# Patient Record
(demographics unavailable — no encounter records)

---

## 2024-10-16 NOTE — PHYSICAL EXAM
[General Appearance - Well Developed] : well developed [Normal Appearance] : normal appearance [Well Groomed] : well groomed [General Appearance - Well Nourished] : well nourished [No Deformities] : no deformities [General Appearance - In No Acute Distress] : no acute distress [Normal Conjunctiva] : the conjunctiva exhibited no abnormalities [Eyelids - No Xanthelasma] : the eyelids demonstrated no xanthelasmas [Respiration, Rhythm And Depth] : normal respiratory rhythm and effort [Exaggerated Use Of Accessory Muscles For Inspiration] : no accessory muscle use [Auscultation Breath Sounds / Voice Sounds] : lungs were clear to auscultation bilaterally [Heart Rate And Rhythm] : heart rate and rhythm were normal [Heart Sounds] : normal S1 and S2 [Abdomen Soft] : soft [Abdomen Tenderness] : non-tender [Abdomen Mass (___ Cm)] : no abdominal mass palpated [Abnormal Walk] : normal gait [Gait - Sufficient For Exercise Testing] : the gait was sufficient for exercise testing [Nail Clubbing] : no clubbing of the fingernails [Cyanosis, Localized] : no localized cyanosis [Petechial Hemorrhages (___cm)] : no petechial hemorrhages [Skin Color & Pigmentation] : normal skin color and pigmentation [] : no rash [No Venous Stasis] : no venous stasis [Skin Lesions] : no skin lesions [No Skin Ulcers] : no skin ulcer [No Xanthoma] : no  xanthoma was observed [Oriented To Time, Place, And Person] : oriented to person, place, and time [Affect] : the affect was normal [Mood] : the mood was normal [No Anxiety] : not feeling anxious [FreeTextEntry1] : trace to mild edema

## 2024-10-16 NOTE — ASSESSMENT
[FreeTextEntry1] : Aortic valve disease- Pt has been followed by echo since at least 2017.  He has had mild and mild to moderate aortic insufficiency(AI). Echo from January 2022 reveals mild to moderate AI and now mild aortic stenosis(AS). We discussed how this valve lesions will progress, generally slowly.  We follow disease progression by echo and physical exam.  Parameters that we follow are heart size and function and the aortic valve size and pressure gradients.  Updated July 2022.  Mild to mod AS. Aug 2023.  PreOp- Pt is cleared for lumbar spine surgery. C spine surgery went well.  He has mild to mod AS.  Also has CAD with a CCS of 344.  Last Nuc April 2021 was unchanged with inferior scar, no ischemia. Dr Smith will control the Warfarin again.  Needs cataract surgery.  He is cleared. 9/19/24 optimized for spinal surgery 9/24. had an echo today with mild to mod AS -stable, good LV fx. ekg stable. will hold lisinopril and eplerenone the am of surgery. anticoag per Dr Smith's recommendation   Ongoing back pain, 1 x Tramadol Rx, iSTOP performed. BP low, reduce Norvasc to 5 mg   ASHD- , + Nuc March 2019, IWMI despite prone imaging, Echo Feb 2019 WNL.  I suspect a small IWMI. Can vaguely see it on EKG.  Nuclear stress test was unchanged in 2021 prior to GB surgery.  Fatigue- With walking and occasionally at home, I suspect low BP, Norvasc  at 10 mg.  HTN- BPs up .  In light of the AI, would aim for a lower BP. Saw Dr Pike.  BPs have been good previously but might have been exacerbated by the pain.  GB removed, abdominal pain resolved. BP reasonable on triple therapy. Lisinopril reduced from 40 mg to 10 mg.   BP up, 160/60, could be d/t moderate AI.  Will increase Norvasc back to 10 mg.  Reducing to 5 mg again for low BP.   Lupus AC- maintained on Warfarin and INR followed by Dr Smith, Remote PE.  HLD- continue statin 28-Feb-2024 13:11

## 2024-10-16 NOTE — REASON FOR VISIT
[FreeTextEntry1] : 74 y/o M with a + Lupus AC maintained on Coumadin for DVT and PE..  Also has a h/o mild to moderate aortic insufficiency.  Echo done at [facility] Feb 2019 mod AI, NL wall motion, Nuc March 2019 c/w small IWMI. Here with wife 8/26/19 1/2/20 On diuretic for his ear.  Reports loss of energy with walking, and also at home. No CP, + DIEHL but unchanged 4/15/21 BPH to have prostate surgery, nuc 2 y/a had an IWMI without ischemia 2/2/22 On teva betahistine.  Echo updated 1/21/22 revealing mild AS and mild to moderate AI. 7/20/22 Having c spine surgery Aug 8.  Echo updated July 2022, mild to mod AS. 1/4/23 Had C spine surgery Oct 2022 and now needs lumbar spinal surgery.  Scheduled for 1/24/23 with Dr Moncada. 5/10/23 s/p spine lumbar surgery, went well. 10/10/23 s/p 2 spine surgeries. 4/10/24 On Eplerenone for proteinuria, having back pain. 9/18/24 having lumbar spine surgery with Dr Schwab on 9/24/24. fax [phone]. Dr Smith is managing coumadin -last dose thursday 9/19 - last dose of lovenox sunday 9/22. feeling well overall. significantly limited by pain. has previously tolerated spinal surgeries however reports low bps during surgery.  10/16/24  s/p 2 spine surgeries and still in moderate pain.  EKG: NSR, normal axis and intervals, Q in 3 and F, LVH, PRWP, no ST-Tw abnormalities. 2/25/19 EKG: NSR, normal axis and intervals, PRWP, probable old IWMI, no ST-Tw abnormalities.  EKG: NSR, small Q waves in 3 and F, 4/15/21

## 2024-10-16 NOTE — HISTORY OF PRESENT ILLNESS
[FreeTextEntry1] : Renal- Dr Pike Neuro- Dr Najjar(Sz) - Dr Boyd Pulobie- Dr Smith Heme- Dr Digna Jones- Dr Lei  [facility]  [facility]     Wife Raysa Keenan

## 2024-10-29 NOTE — PHYSICAL EXAM
[General Appearance - Alert] : alert [General Appearance - In No Acute Distress] : in no acute distress [Sclera] : the sclera and conjunctiva were normal [Jugular Venous Distention Increased] : there was no jugular-venous distention [Auscultation Breath Sounds / Voice Sounds] : lungs were clear to auscultation bilaterally [Heart Rate And Rhythm] : heart rate was normal and rhythm regular [Heart Sounds] : normal S1 and S2 [Systolic grade ___/6] : A grade [unfilled]/6 systolic murmur was heard. [___ +] : bilateral [unfilled]+ pretibial pitting edema [Abdomen Soft] : soft [Abdomen Tenderness] : non-tender [No CVA Tenderness] : no ~M costovertebral angle tenderness [Involuntary Movements] : no involuntary movements were seen [] : no rash [Oriented To Time, Place, And Person] : oriented to person, place, and time [Affect] : the affect was normal Yes

## 2024-10-29 NOTE — PHYSICAL EXAM
[General Appearance - Alert] : alert [General Appearance - In No Acute Distress] : in no acute distress [Sclera] : the sclera and conjunctiva were normal [Jugular Venous Distention Increased] : there was no jugular-venous distention [Auscultation Breath Sounds / Voice Sounds] : lungs were clear to auscultation bilaterally [Heart Rate And Rhythm] : heart rate was normal and rhythm regular [Heart Sounds] : normal S1 and S2 [Systolic grade ___/6] : A grade [unfilled]/6 systolic murmur was heard. [___ +] : bilateral [unfilled]+ pretibial pitting edema [Abdomen Soft] : soft [Abdomen Tenderness] : non-tender [No CVA Tenderness] : no ~M costovertebral angle tenderness [Involuntary Movements] : no involuntary movements were seen [] : no rash [Oriented To Time, Place, And Person] : oriented to person, place, and time [Affect] : the affect was normal

## 2024-10-30 NOTE — HISTORY OF PRESENT ILLNESS
[FreeTextEntry1] : f/u CKD s/p repeat spine surgery which seems to be more successful with less pain lower back -- still numbness rt leg but slt better  amlodipine lowered by Dr Mitchell bec of low BP ( and leg swelling)  BP now 120/70 range now at PT noted some swelling tops of feet- no better with less amlodipine meds reviewed with pt and list updated labs done and reviewed - 10/23: ur alb 235 mg, cr 1.6, k 5.1, alb 3.5, hco3 27,ca 8.9,  ph 3.2, uric 7.2,  , cys eGFR 24 , hgb 8.2,  PCP Dr Smith

## 2024-10-30 NOTE — REVIEW OF SYSTEMS
[Negative] : Heme/Lymph [As noted in HPI] : as noted in HPI [Lower Ext Edema] : lower extremity edema [Sleep Disturbances] : sleep disturbances [de-identified] : nocturia and insomnia

## 2024-10-30 NOTE — ASSESSMENT
[FreeTextEntry1] : CKD 3  stable ( CKD 4 by cys EGFR)  BP well controlled albuminuria appears less on eplerenone ( with ACEi )  significant edema legs will lower amlodipine further to 2.5 mg -- follow BP and may stop it if BP remains good  ideally would use diuretic for edema (and stop amlodipine) for BP-- but has not tolerated any diuretics in past ( incl SGLT2i )  bec of  sx -- advised see CHRIS hennessy ( saw Dr Boyd in past)  I am also reluctant to increase MRA ( along with ACEi) without another med such as a k lowering diuretic to lower k add vit D3 2000 IU/d and follow PTH  f/u 2 mos with labs

## 2024-10-30 NOTE — REVIEW OF SYSTEMS
[Negative] : Heme/Lymph [As noted in HPI] : as noted in HPI [Lower Ext Edema] : lower extremity edema [Sleep Disturbances] : sleep disturbances [de-identified] : nocturia and insomnia

## 2024-11-05 NOTE — PHYSICAL EXAM
[de-identified] : NVI to the extremities.  Wound healed well, small crusts visible. [de-identified] : Scoliosis xray 10/31/2024:  Instrumentation intact, no halos about the screws.

## 2024-11-05 NOTE — PHYSICAL EXAM
[de-identified] : NVI to the extremities.  Wound healed well, small crusts visible. [de-identified] : Scoliosis xray 10/31/2024:  Instrumentation intact, no halos about the screws.

## 2024-11-05 NOTE — HISTORY OF PRESENT ILLNESS
[de-identified] : Pt is approximately 5 weeks s/p revision thoracolumbar spinal fusion.  He is walking with the cane or walker in the hallways.  Had lumbar pain and saw his kidney specialist and cardiologist, medications changed.  Pre-op numbness in his left distal leg is a little better.  Doing PT at home BIW, goal is to walk outside with PT.  Has seen slight progress.  Taking muscle relaxant and oxycodone 5mg prn.

## 2024-11-05 NOTE — DISCUSSION/SUMMARY
[de-identified] : A lengthy discussion was had with the patient regarding his condition.  Continue with regular exercise and walking program.  Rx outpt PT.  F/u in 2 months. The patient's questions were sought and answered satisfactorily.  I, Yulia Flores NP am acting as a scribe for Dr. Frank Schwab.

## 2024-11-05 NOTE — HISTORY OF PRESENT ILLNESS
[de-identified] : Pt is approximately 5 weeks s/p revision thoracolumbar spinal fusion.  He is walking with the cane or walker in the hallways.  Had lumbar pain and saw his kidney specialist and cardiologist, medications changed.  Pre-op numbness in his left distal leg is a little better.  Doing PT at home BIW, goal is to walk outside with PT.  Has seen slight progress.  Taking muscle relaxant and oxycodone 5mg prn.

## 2024-11-05 NOTE — DISCUSSION/SUMMARY
[de-identified] : A lengthy discussion was had with the patient regarding his condition.  Continue with regular exercise and walking program.  Rx outpt PT.  F/u in 2 months. The patient's questions were sought and answered satisfactorily.  I, Yulia Flores NP am acting as a scribe for Dr. Frank Schwab.

## 2024-11-29 NOTE — PHYSICAL EXAM
[General Appearance - Well Developed] : well developed [General Appearance - Well Nourished] : well nourished [Urethral Meatus] : meatus normal [Scrotum] : the scrotum was normal [de-identified] : 15 cc testicles b/l, no penoscrotal edema , no penile or scrotal lesions,  [de-identified] : +pitting edema b/l

## 2024-11-29 NOTE — ASSESSMENT
[FreeTextEntry1] : BPH/LUTS - non bothersome  no urologic source for lower extremity edema PVR to r/o retention - 24 cc  UA/UCx  Pt concerned about LE pitting edema - discussed following up with PCP/nephrology for optimization  f/u 1 year

## 2024-11-29 NOTE — HISTORY OF PRESENT ILLNESS
[FreeTextEntry1] : 74M presents as new visit for LUTS  Referred by: Dr. Pike (nephrology)  Has spinal stenosis , had two thoracolumbar spinal fusion (1.5 years ago, and most recent one 1/2024). Able to ambulate with a cane or walker. For ~4 months now, he has developed LE edema . Saw nephrology (Dr. Pike)  BPH/LUTS: Nocturia 2-3x. No hematuria or dysuria. Strong urinary FoS. Occasional splitting but not often. No urgency. Has a obrien catheter david-surgery, but otherwise no urinary retention. Not on any BPH meds. Is currently taking OTC "superbeets" to help with urination.   ED: Wife and him not as active as they once were, but no complaints as of now.   He is very concerned with LE edema. Amlodipine dose has been reduced.   PMH: bilateral subdural hematomas (?), DVT L LE + PE (>20 years ago), lupus anticoagulant, seizure, gastritis, skin melanoma, HTN, HLD, renal insufficiency/CKD, heart murmur PSH: cholecystectomy, thoracolumbar fusion X2  Meds: amlodipine, atorvastatin, carvedilol, eplerenone, lisinopril, tramadol, warfarin , levetiracetam, HCTZ, pantoprazole, oxycoodone   PSA screening:  PSA 3.0, Free PSA 1.07  (8/21/23)  Labs 10/23/24: Cre 1.64  GFR 44  H/H 8.2/ 27.4  UA 9/12/24: nitrite neg, LE neg, wbc 0, rbc 2, bacteria negative

## 2025-01-17 NOTE — HISTORY OF PRESENT ILLNESS
[de-identified] : Pt presents in f/u.   He is approx 4 months s/p revision thoraolumbar spinal fusion.  He has dull low back pain with twisting.  No pain lying down.  He wakes up with considerable low back pain, walking helps.  Had at home PT.  Takes tramadol 50mg prn pain.

## 2025-01-17 NOTE — DISCUSSION/SUMMARY
[de-identified] : A lengthy discussion was had with the patient regarding his condition.  Rx PT.  Rx tramadol 50 mg 1 tab po q4-6hrs prn pain,  Checked I-STOP and no aberrancy noted.  F/u in 3 months.  The patient's questions were sought and answered satisfactorily.  Yulia GALLAGHER NP am acting as a scribe for Dr. Frank Schwab.

## 2025-01-30 NOTE — REVIEW OF SYSTEMS
[As noted in HPI] : as noted in HPI [As Noted in HPI] : as noted in HPI [FreeTextEntry8] : occasional diarrhea

## 2025-01-30 NOTE — ASSESSMENT
[FreeTextEntry1] : CKD3/4 - sCr 1.8 from 1.6,  - Continue lisinopril 40, eplerenone 25 - Not on SGLT2 due to  symptoms with it -- (though is now tolerating HCTZ)- may consider try again in future for albuminuric CKD.  - Edema improved with lower amlodipine  Secondary hyperparathyroidism - PTH up to 200, Ca 9.0, phos 3s - Cont vitamin D supplementation - Will add calcitriol 0.25mcg TIW and follow   HTN - BP appears less controlled overall and higher at home - Try Increase HCTZ to 25mg daily, otherwise continue regimen - Continue home BP monitoring - consider try SGLT2i again if tolerates higher dose diuretic   Return to office in 3-4 months with repeat labs

## 2025-01-30 NOTE — HISTORY OF PRESENT ILLNESS
[FreeTextEntry1] : f/u CKD planned for endoscopy next week to assess for ulcer Leg swelling a little better now on lower dose of amlodipine, wearing compression stockings SBP running now 130-145 at home meds reviewed with pt and list updated labs done and reviewed - 1/24: ur alb 202 mg, cr 1.8, k 5.3, alb 4.0, hco3 26, ca 8.9, ph 3.7, uric 7.6, , cys eGFR 20, hgb 9.4 PCP Dr Smith

## 2025-01-30 NOTE — PHYSICAL EXAM
[General Appearance - Alert] : alert [General Appearance - In No Acute Distress] : in no acute distress [Sclera] : the sclera and conjunctiva were normal [Extraocular Movements] : extraocular movements were intact [Neck Appearance] : the appearance of the neck was normal [Auscultation Breath Sounds / Voice Sounds] : lungs were clear to auscultation bilaterally [Heart Rate And Rhythm] : heart rate was normal and rhythm regular [Heart Sounds] : normal S1 and S2 [Abdomen Soft] : soft [Abdomen Tenderness] : non-tender [Nail Clubbing] : no clubbing  or cyanosis of the fingernails [Skin Color & Pigmentation] : normal skin color and pigmentation [] : no rash [Sensation] : the sensory exam was normal to light touch and pinprick [No Focal Deficits] : no focal deficits [Oriented To Time, Place, And Person] : oriented to person, place, and time [FreeTextEntry1] : Trace LE edema, compression stockings on

## 2025-01-31 NOTE — HISTORY OF PRESENT ILLNESS
[FreeTextEntry1] : Renal- Dr Pike Neuro- Dr Najjar(Sz) - Dr Boyd Pulobie- Dr Smith Heme- Dr Digna Jones- Dr Lei  [facility]  [facility]     Wife Raysa Keenan

## 2025-01-31 NOTE — ASSESSMENT
[FreeTextEntry1] : Aortic valve disease- Pt has been followed by echo since at least 2017.  He has had mild and mild to moderate aortic insufficiency(AI). Echo from January 2022 reveals mild to moderate AI and now mild aortic stenosis(AS). We discussed how this valve lesions will progress, generally slowly.  We follow disease progression by echo and physical exam.  Parameters that we follow are heart size and function and the aortic valve size and pressure gradients.  Updated July 2022.  Mild to mod AS. Aug 2023.  PreOp- Having EGD 2/5/25.  Cleared to proceed.  Has stable CAD.  Continue Carvedilol, do not hold.  Pt to hold Warfarin for 5 days and bridge with Lovenox.  Echo Sept 2024 moderate AS. Has tolerated surgeries with this.  PreOp- Pt is cleared for lumbar spine surgery. C spine surgery went well.  He has mild to mod AS.  Also has CAD with a CCS of 344.  Last Nuc April 2021 was unchanged with inferior scar, no ischemia. Dr Smith will control the Warfarin again.  Needs cataract surgery.  He is cleared. 9/19/24 optimized for spinal surgery 9/24. had an echo today with mild to mod AS -stable, good LV fx. ekg stable. will hold lisinopril and eplerenone the am of surgery. anticoag per Dr Smith's recommendation   Ongoing back pain, 1 x Tramadol Rx, iSTOP performed. BP low, reduce Norvasc to 5 mg.  Cleared for EGD.   ASHD- , + Nuc March 2019, IWMI despite prone imaging, Echo Feb 2019 WNL.  I suspect a small IWMI. Can vaguely see it on EKG.  Nuclear stress test was unchanged in 2021 prior to GB surgery.  Fatigue- With walking and occasionally at home, I suspect low BP, Norvasc  at 10 mg.  HTN- BPs up .  In light of the AI, would aim for a lower BP. Saw Dr Pike.  BPs have been good previously but might have been exacerbated by the pain.  GB removed, abdominal pain resolved. BP reasonable on triple therapy. Lisinopril reduced from 40 mg to 10 mg.   BP up, 160/60, could be d/t moderate AI.  Will increase Norvasc back to 10 mg.  Reducing to 5 mg again for low BP.   Lupus AC- maintained on Warfarin and INR followed by Dr Smith, Remote PE.  HLD- continue statin

## 2025-01-31 NOTE — REASON FOR VISIT
[FreeTextEntry1] : 77 y/o M with a + Lupus AC maintained on Coumadin for DVT and PE.  Also has a h/o mild to moderate aortic insufficiency.  Echo done at [facility] Feb 2019 mod AI, NL wall motion, Nuc March 2019 c/w small IWMI. Here with wife 8/26/19 1/2/20 On diuretic for his ear.  Reports loss of energy with walking, and also at home. No CP, + DIEHL but unchanged 4/15/21 BPH to have prostate surgery, nuc 2 y/a had an IWMI without ischemia 2/2/22 On teva betahistine.  Echo updated 1/21/22 revealing mild AS and mild to moderate AI. 7/20/22 Having c spine surgery Aug 8.  Echo updated July 2022, mild to mod AS. 1/4/23 Had C spine surgery Oct 2022 and now needs lumbar spinal surgery.  Scheduled for 1/24/23 with Dr Moncada. 5/10/23 s/p spine lumbar surgery, went well. 10/10/23 s/p 2 spine surgeries. 4/10/24 On Eplerenone for proteinuria, having back pain. 9/18/24 having lumbar spine surgery with Dr Schwab on 9/24/24. fax [phone]. Dr Smith is managing coumadin -last dose thursday 9/19 - last dose of lovenox sunday 9/22. feeling well overall. significantly limited by pain. has previously tolerated spinal surgeries however reports low bps during surgery.  10/16/24  s/p 2 spine surgeries and still in moderate pain. 1/31/25   Having EGD with Dr Kelvin Millard 2/5/25   EKG: NSR, normal axis and intervals, Q in 3 and F, LVH, PRWP, no ST-Tw abnormalities. 2/25/19 EKG: NSR, normal axis and intervals, PRWP, probable old IWMI, no ST-Tw abnormalities.  EKG: NSR, small Q waves in 3 and F, 4/15/21

## 2025-02-27 NOTE — HISTORY OF PRESENT ILLNESS
[Obstructive Sleep Apnea] : obstructive sleep apnea [CPAP:] : CPAP [Full Face mask] : full face mask [Never] : never [TextBox_4] : he is doing well after surgery.  he is not constipated.  His tongue seems unable to taste as well.  he is compliant with the coumadin.  No falls.  No bleeding

## 2025-02-27 NOTE — ASSESSMENT
[FreeTextEntry1] : Coronary artery disease The patient is clinically stable we will follow-up with the cardiologist.  No evidence of underlying CHF or unstable angina.  He is to continue current regimen.  Back pain  The patient had  surgery and will be starting physical therapy.  The patient is follow-up by orthopedic surgery.  No falls  Benign prostatic hypertrophy  Stable no evidence obstruction  Chronic renal disease  Follow-up on the creatinine.  He has adequate urine output.  Hypertension  I repeated the blood pressure manually was 130/50.  He is to continue on the current regimen.  Struct of sleep apnea  His compliant with the CPAP mask.  He uses a full facemask  Lupus anticoagulant  Follow-up on repeat blood.  No clinical evidence neither reflect failure of therapy or bleeding complication  Patient is clinically stable.  No constipation.  He was seen by dermatology

## 2025-04-08 NOTE — REASON FOR VISIT
[Home] : at home, [unfilled] , at the time of the visit. [Medical Office: ([facility])___] : at the medical office located in  [Telephone (audio)] : This telephonic visit was provided via audio only technology. [Verbal consent obtained from patient] : the patient, [unfilled] [Follow-Up] : a follow-up visit [TextBox_44] : elevated bp

## 2025-05-06 NOTE — PHYSICAL EXAM
[de-identified] : NVI to the extremities.  Stands with erect posture.   [de-identified] : Scoliosis xrays 5/1/2025:  Instrumentation intact, no halos about the screws.

## 2025-05-06 NOTE — DISCUSSION/SUMMARY
[de-identified] : A lengthy discussion was had with the patient regarding his condition.  Continue with regular exercise and walking program.  F/u in 3 months.  Refill tramadol 50mg 1 tab po q6hrs prn pain.  ISTOP checked and no aberrancy noted.  The patient's questions were sought and answered satisfactorily.   IYulia NP am acting as a scribe for Dr. Frank Schwab.

## 2025-05-06 NOTE — HISTORY OF PRESENT ILLNESS
[de-identified] : Pt is approximately 7 months s/p revision thoracolumbar spinal fusion.  He had 2 falls and fractured his L shoulder, in sling.  He stopped PT due to this.  Had been walking regularly.  Takes tramadol 50mg prn pain.

## 2025-05-06 NOTE — HISTORY OF PRESENT ILLNESS
[de-identified] : Pt is approximately 7 months s/p revision thoracolumbar spinal fusion.  He had 2 falls and fractured his L shoulder, in sling.  He stopped PT due to this.  Had been walking regularly.  Takes tramadol 50mg prn pain.

## 2025-05-06 NOTE — PHYSICAL EXAM
[de-identified] : NVI to the extremities.  Stands with erect posture.   [de-identified] : Scoliosis xrays 5/1/2025:  Instrumentation intact, no halos about the screws.

## 2025-05-06 NOTE — DISCUSSION/SUMMARY
[de-identified] : A lengthy discussion was had with the patient regarding his condition.  Continue with regular exercise and walking program.  F/u in 3 months.  Refill tramadol 50mg 1 tab po q6hrs prn pain.  ISTOP checked and no aberrancy noted.  The patient's questions were sought and answered satisfactorily.   IYulia NP am acting as a scribe for Dr. Frank Schwab.

## 2025-05-13 NOTE — HISTORY OF PRESENT ILLNESS
[FreeTextEntry1] : Renal- Dr Pike Neuro- Dr Najjar(Sz) - Dr Boyd Pulobie- Dr Smith Heme- Dr Digna Jones- Dr Lei  [facility]  [facility] [place] [facility] med     Wife Raysa Keenan

## 2025-05-13 NOTE — ASSESSMENT
[FreeTextEntry1] : Aortic valve disease- Pt has been followed by echo since at least 2017.  He has had mild and mild to moderate aortic insufficiency(AI). Echo from January 2022 reveals mild to moderate AI and now mild aortic stenosis(AS). We discussed how this valve lesions will progress, generally slowly.  We follow disease progression by echo and physical exam.  Parameters that we follow are heart size and function and the aortic valve size and pressure gradients.  Updated July 2022.  Mild to mod AS. Aug 2023.  Sept 2024 mild to mod AS, mod AI.  PreOp- Having EGD 2/5/25.  Cleared to proceed.  Has stable CAD.  Continue Carvedilol, do not hold.  Pt to hold Warfarin for 5 days and bridge with Lovenox.  Echo Sept 2024 moderate AS. Has tolerated surgeries with this.  PreOp- Pt is cleared for lumbar spine surgery. C spine surgery went well.  He has mild to mod AS.  Also has CAD with a CCS of 344.  Last Nuc April 2021 was unchanged with inferior scar, no ischemia. Dr Smith will control the Warfarin again.  Needs cataract surgery.  He is cleared. 9/19/24 optimized for spinal surgery 9/24. had an echo today with mild to mod AS -stable, good LV fx. ekg stable. will hold lisinopril and eplerenone the am of surgery. anticoag per Dr Smith's recommendation   Ongoing back pain, 1 x Tramadol Rx, iSTOP performed. BP low, reduce Norvasc to 5 mg.  Cleared for EGD.  and spine surg Sept 2024.   ASHD- , + Nuc March 2019, IWMI despite prone imaging, Echo Feb 2019 WNL.  I suspect a small IWMI. Can vaguely see it on EKG.  Nuclear stress test was unchanged in 2021 prior to GB surgery.  Fatigue- With walking and occasionally at home, I suspect low BP, Norvasc  at 10 mg.  HTN- BPs up .  In light of the AI, would aim for a lower BP. Saw Dr Pike.  BPs have been good previously but might have been exacerbated by the pain.  GB removed, abdominal pain resolved. BP reasonable on triple therapy. Lisinopril reduced from 40 mg to 10 mg.   BP up, 160/60, could be d/t moderate AI.  Will increase Norvasc back to 10 mg.  Reducing to 5 mg again for low BP.   Lupus AC- maintained on Warfarin and INR followed by Dr Smith, Remote PE.  HLD- continue statin

## 2025-05-24 NOTE — REVIEW OF SYSTEMS
[Lower Ext Edema] : lower extremity edema [Nocturia] : nocturia [Fever] : no fever [Chills] : no chills [Nasal Discharge] : no nasal discharge [Sore Throat] : no sore throat [Chest Pain] : no chest pain [Palpitations] : no palpitations [Shortness Of Breath] : no shortness of breath [Wheezing] : no wheezing [Cough] : no cough [Abdominal Pain] : no abdominal pain [Vomiting] : no vomiting [Constipation] : no constipation [Dysuria] : no dysuria [Arthralgias] : no arthralgias [Skin Lesions] : no skin lesions [Dizziness] : no dizziness [de-identified] : fatigue

## 2025-05-24 NOTE — HISTORY OF PRESENT ILLNESS
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 No dysuria or hematuria.  PCP previously requested patient stop lisinopril, has been off for ~6 weeks. Also not taking HCTZ.( for low BP?)  Checks BP 2-3 times per week - typically SBP in 130s-140s, diastolic 50s-60s Occasional lightheadedness or dizziness with standing but not frequent Has chronic LE edema, wearing compression stockings Wt at home stable PCP: Dr. Smith Labs reviewed - scr 1.54, ACR 600s, K 5.1, bicarb 28, , cys c egfr 25,

## 2025-05-24 NOTE — REVIEW OF SYSTEMS
[Lower Ext Edema] : lower extremity edema [Nocturia] : nocturia [Fever] : no fever [Chills] : no chills [Nasal Discharge] : no nasal discharge [Sore Throat] : no sore throat [Chest Pain] : no chest pain [Palpitations] : no palpitations [Shortness Of Breath] : no shortness of breath [Wheezing] : no wheezing [Cough] : no cough [Abdominal Pain] : no abdominal pain [Vomiting] : no vomiting [Constipation] : no constipation [Dysuria] : no dysuria [Arthralgias] : no arthralgias [Skin Lesions] : no skin lesions [Dizziness] : no dizziness [de-identified] : fatigue

## 2025-05-24 NOTE — PHYSICAL EXAM
[General Appearance - Alert] : alert [General Appearance - In No Acute Distress] : in no acute distress [Sclera] : the sclera and conjunctiva were normal [Extraocular Movements] : extraocular movements were intact [Neck Appearance] : the appearance of the neck was normal [Auscultation Breath Sounds / Voice Sounds] : lungs were clear to auscultation bilaterally [Heart Rate And Rhythm] : heart rate was normal and rhythm regular [Heart Sounds] : normal S1 and S2 [Abdomen Soft] : soft [Abdomen Tenderness] : non-tender [Nail Clubbing] : no clubbing  or cyanosis of the fingernails [Involuntary Movements] : no involuntary movements were seen [Skin Color & Pigmentation] : normal skin color and pigmentation [] : no rash [No Focal Deficits] : no focal deficits [Oriented To Time, Place, And Person] : oriented to person, place, and time [FreeTextEntry1] : 2+ LE edema bilaterally, compression stockings in place

## 2025-05-24 NOTE — ASSESSMENT
[FreeTextEntry1] : CKD3 w/ albuminuria - sCr lower off of HCTZ and lisinopril - 1.5 now - ACR increased to 600 range - Off lisinopril for hyperkalemia which has resolved - will start losartan 50mg for now and stop eplerenone as seems may have  sx from it  - Has had issues with SGLT2 in the past - either not able to get or not tolerating-- pt cannot recall and both reasons listed in chart  - BP suboptimal-- advised cont to f.u at home -- titrate losartan if tolerates  - Recommend Urology f/u for frequency/ nocturia sx which have been bothersome for pt ( and limiting diuretic use) - will trial flomax for now  HTN - SBP 140s at home, here 132 - Switch eplerenone to losartan as above and titrate  - Not taking HCTZ reportedly-- but may restart if BP high ( and edema) - Continue to monitor BP at home  Sec hyperpara - PTH 100s, improved on current dose of calcitriol. Continue for now.  Return to office with repeat labs in 2-3  months

## 2025-07-08 NOTE — PHYSICAL EXAM
[Normal Appearance] : normal appearance [Well Groomed] : well groomed [General Appearance - In No Acute Distress] : no acute distress [Respiration, Rhythm And Depth] : normal respiratory rhythm and effort [Exaggerated Use Of Accessory Muscles For Inspiration] : no accessory muscle use [Urethral Meatus] : meatus normal [Penis Abnormality] : normal circumcised penis [Scrotum] : the scrotum was normal [Testes Tenderness] : no tenderness of the testes [Testes Mass (___cm)] : there were no testicular masses [Normal Station and Gait] : the gait and station were normal for the patient's age [] : no rash [No Focal Deficits] : no focal deficits [Oriented To Time, Place, And Person] : oriented to person, place, and time [Affect] : the affect was normal [Mood] : the mood was normal [de-identified] : B/L 20cc testes, no masses. No rash

## 2025-07-08 NOTE — END OF VISIT
[FreeTextEntry3] : I, Dr. Boyd, personally performed the evaluation and management (E/M) services for this established patient who presents today with (a) new problem(s)/exacerbation of (an) existing condition(s). That E/M includes conducting the clinically appropriate interval history &/or exam, assessing all new/exacerbated conditions, and establishing a new plan of care. Today, my REBECCA, [employer] Rosenins, was here to observe my evaluation and management service for this new problem/exacerbated condition and follow the plan of care established by me going forward

## 2025-07-08 NOTE — HISTORY OF PRESENT ILLNESS
[FreeTextEntry1] : CLIFTON REYES is a 75 year M presenting on 07/08/2025 PMH: BPH s/p Rezum 4/2021, ED, CAD, CKD, HTN, seizure 8 yrs ago, SERG, lumbar surgery, lung nodule, PE meds: warfarin,   8 mo F/U  Dr Pike (nephro) informed patient that blood glucose increasing and told to see us.  BPH s/p Rezum: Daytime frequency. Nocturia 2-3x. No urgency or incontinence. Intermittent stream. No hematuria LE edema On tamsulosin for a month or two but stopped. Making urination more frequent. No H/O kidney stones. PVR to r/o retention: 30 mL  ED: tadalafil in the past No concern at this time  Aunt bladder cancer

## 2025-07-08 NOTE — ASSESSMENT
[FreeTextEntry1] : 74yo M BPH bothersome frequency +lower extremity edema recommend eval for diuretcs cont compression stocking  -UA, Ucx -trial myrbetriq 50mg -F/U 6 weeks

## 2025-07-29 NOTE — ASSESSMENT
Patients last appointment 5/8/2025.  Patients next scheduled appointment   Future Appointments   Date Time Provider Department Center   11/10/2025  4:30 PM Amanuel Villagran DO [facility]       [FreeTextEntry1] : Hypertension  Initially the patient was hypotensive all antihypertensive medication were put on hold.  The patient is on Coreg at this point and will restart the amlodipine.  The patient will check his blood pressure on daily basis and will talk on Monday regarding any adjustment in antihypertensive meds.  Chronic renal disease  Last creatinine increased to 1.8.  Hold hydrochlorothiazide.  Increase hydration.  Better control of the blood pressure and will follow